# Patient Record
Sex: MALE | Race: OTHER | Employment: FULL TIME | URBAN - METROPOLITAN AREA
[De-identification: names, ages, dates, MRNs, and addresses within clinical notes are randomized per-mention and may not be internally consistent; named-entity substitution may affect disease eponyms.]

---

## 2018-07-22 ENCOUNTER — HOSPITAL ENCOUNTER (EMERGENCY)
Age: 38
Discharge: HOME | End: 2018-07-22
Attending: EMERGENCY MEDICINE
Payer: SELF-PAY

## 2018-07-22 ENCOUNTER — APPOINTMENT (OUTPATIENT)
Dept: CT IMAGING | Age: 38
End: 2018-07-22
Attending: EMERGENCY MEDICINE
Payer: SELF-PAY

## 2018-07-22 VITALS
SYSTOLIC BLOOD PRESSURE: 121 MMHG | RESPIRATION RATE: 16 BRPM | WEIGHT: 140 LBS | BODY MASS INDEX: 23.9 KG/M2 | HEIGHT: 64 IN | DIASTOLIC BLOOD PRESSURE: 81 MMHG | HEART RATE: 68 BPM | TEMPERATURE: 98.4 F | OXYGEN SATURATION: 100 %

## 2018-07-22 DIAGNOSIS — R51.9 NONINTRACTABLE HEADACHE, UNSPECIFIED CHRONICITY PATTERN, UNSPECIFIED HEADACHE TYPE: Primary | ICD-10-CM

## 2018-07-22 PROCEDURE — 99282 EMERGENCY DEPT VISIT SF MDM: CPT

## 2018-07-22 PROCEDURE — 70450 CT HEAD/BRAIN W/O DYE: CPT

## 2018-07-22 RX ORDER — OXYMETAZOLINE HCL 0.05 %
2 SPRAY, NON-AEROSOL (ML) NASAL 2 TIMES DAILY
Qty: 15 ML | Refills: 0 | Status: SHIPPED | OUTPATIENT
Start: 2018-07-22 | End: 2018-07-25

## 2018-07-22 RX ORDER — IBUPROFEN 600 MG/1
600 TABLET ORAL
Qty: 20 TAB | Refills: 0 | Status: SHIPPED | OUTPATIENT
Start: 2018-07-22

## 2018-07-22 NOTE — ED TRIAGE NOTES
Triage note: Pt arrives with c/o frontal headache x 1 week. Pt states pain is constant pressure above and behind eyes. Denies vision changes, N/V. Taking medication with no relief. Pt reports ringing in his ears.

## 2018-07-22 NOTE — DISCHARGE INSTRUCTIONS
Dolor de sai: Instrucciones de cuidado - [ Headache: Care Instructions ]  Instrucciones de cuidado    Los tigist de sai tienen muchas causas posibles. La mayoría de los tigist de sai no son señal de un problema más verena y mejoran por sí solos. El tratamiento en el hogar podría ayudarlo a sentirse mejor con Rojean Levels. El médico lo coles revisado minuciosamente, rogelio puede desarrollar problemas más tarde. Si nota algún problema o síntomas, busque tratamiento médico inmediatamente. La atención de seguimiento es mata parte clave de ly tratamiento y seguridad. Asegúrese de hacer y acudir a todas las citas, y llame a ly médico si está teniendo problemas. También es mata buena idea saber los resultados de fatmata exámenes y mantener mata lista de los medicamentos que reddy. ¿Cómo puede cuidarse en el hogar? · No conduzca si ha tomado analgésicos (medicamentos para el dolor) recetados. · Descanse en un cuarto tranquilo y oscuro hasta que desaparezca el dolor de [place]. Cierre los ojos y trate de relajarse o dormirse. No garret la televisión ni curtis. · Colóquese un paño frío y húmedo o [facility] compresa fría sobre la beto adolorida de 10 a 21 minutos cada vez. Póngase un paño bhat entre la compresa fría y la piel. · Utilice mata toalla húmeda tibia o mata almohadilla térmica ajustada a [place] temperatura para relajar los músculos tensos del lizbet y los hombros.  · Pídale a alguien que le mary masajes suaves en el lizbet y los hombros.  · [employer] los analgésicos exactamente wojciech le fueron indicados. ¨ Si el médico le recetó un analgésico, tómelo según las indicaciones. ¨ Si no está tomando un analgésico recetado, pregúntele a ly médico si puede david suzanne de [employer]. · Tenga cuidado de no david analgésicos con mayor frecuencia que la permitida en las indicaciones porque los tigist de sia podrían empeorar o aparecer con mayor frecuencia mata vez que el medicamento pierda ly [place].   · Preste atención a los nuevos síntomas que aparecen con el dolor de [place], [place], debilidad o entumecimiento, cambios en la visión o confusión. Podrían ser señales de un problema más grave. Para prevenir los tigist de sai  · [employer] un diario de fatmata tigist de sai para que pueda averiguar qué los desencadena. Evitar los desencadenantes podría ayudar a prevenir los tigist de [place]. Anote cuándo empieza cada dolor de [place], cuánto dura y cómo es el dolor (palpitante, esequiel, punzante o sordo). Anote cualquier otro síntoma que haya tenido con el dolor de [place], [place] náuseas, destellos de no o MARCELO, o sensibilidad a la no brillante o a los ruidos mark. Anote si el dolor de sai ocurrió cerca de ly menstruación. Enumere todos los factores que pudieran kimberly desencadenado el dolor de [place], wojciech ciertos alimentos (chocolate, queso, vino) u olores, humo, luces brillantes, estrés o falta de sueño. · Encuentre maneras saludables de [facility]. Los tigist de [place] son más comunes drew o tab después de un momento estresante. Tómese un tiempo para relajarse antes y después de hacer algo que le haya causado un dolor de sai en el pasado. · Trate de mantener fatmata músculos relajados mediante mata buena postura. Revise si tiene [employer] de la [place], la kenyetta, el lizbet y los hombros y trate de relajarlos. Cuando se siente en un escritorio, cambie de posición con frecuencia y estírese por 27 segundos cada hora. · Dang suficiente ejercicio y duerma bastante. · Coma en forma regular y ekaterina. Largos períodos sin comer pueden provocar un dolor de sai. · Regálese un masaje. Algunas personas encuentran que los masajes hechos con regularidad son Jose Edelson para aliviar la tensión. · Limite la cafeína. No wanda demasiado café, té ni sodas. Yury no deje de consumir cafeína de repente, porque eso también puede provocarle tigist de [place].   · Reduzca la tensión en los ojos a causa de la computadora parpadeando con frecuencia y apartando la mirada de la pantalla a menudo. Asegúrese de tener lentes adecuados y de que ly monitor esté colocado de manera correcta, wojciech a un brazo de distancia. · Busque ayuda si tiene depresión o ansiedad. Manuela tigist de Kayden Kells podrían relacionarse con estas afecciones. El tratamiento puede prevenir los tigist de Kayden Kells y ayudar con los síntomas de ansiedad o depresión. ¿Cuándo debe pedir ayuda? Llame al 911 en cualquier momento que piense que puede necesitar atención de emergencia. Por ejemplo, llame si:    · Tiene señales de un ataque cerebral. Estas pueden incluir:  ¨ Parálisis, entumecimiento o debilidad repentinos en la kenyetta, el brazo o la pierna, sobre todo si ocurre en un solo lado del cuerpo. ¨ Cambios repentinos en la visión. ¨ Dificultades repentinas para hablar. ¨ Confusión repentina o dificultad para comprender frases sencillas. ¨ Problemas repentinos para caminar o mantener el equilibrio. ¨ Dolor de Kayden Kells intenso y repentino, distinto de los tigist de sai anteriores.    Llame a ly médico ahora mismo o busque atención médica inmediata si:    · Tiene un dolor de [place] o peor.     · Ly dolor de sai empeora mucho. ¿Dónde puede encontrar más información en inglés? Suki Herring a [email or web address].info/. Escriba X655 en la búsqueda para aprender más acerca de \"Dolor de sai: Instrucciones de cuidado - [ Headache: Care Instructions ]. \"  Revisado: 9 octubre, 2017  Versión del contenido: 11.7  © 7039-3873 [employer]. Las instrucciones de cuidado fueron adaptadas bajo licencia por [employer] (which disclaims liability or warranty for this information). Si usted tiene [place] afección médica o sobre estas instrucciones, siempre pregunte a ly profesional de edvin. [employer], disco volante niega toda garantía o responsabilidad por ly uso de esta información.

## 2018-07-22 NOTE — ED PROVIDER NOTES
HPI Comments: 45-year-old  male presents to the emergency department with headache. He reports frontal headache for about a week. The headache is nonradiating. He has had sinus issues in the past and sinus infections. Pt does have some congestion and mild sore throat. No neck pain or neck stiffness. No fevers. No vomiting or diarrhea. He also reports some intermittent ringing in his ears. Patient was seen here twice 2 years ago for headaches. He does have intermittent headaches. No medications were tried prior to arrival. He does report some mild phonophobia and photophobia. Patient denies weakness or numbness in arms or legs. No difficulty with speech or vision. Patient denies chest pain or shortness of breath. He denies tobacco or alcohol use. The history is provided by the patient. History reviewed. No pertinent past medical history. History reviewed. No pertinent surgical history. No family history on file. Social History     Social History    Marital status:      Spouse name: N/A    Number of children: N/A    Years of education: N/A     Occupational History    Not on file. Social History Main Topics    Smoking status: Never Smoker    Smokeless tobacco: Not on file    Alcohol use No    Drug use: Not on file    Sexual activity: Not on file     Other Topics Concern    Not on file     Social History Narrative         ALLERGIES: Review of patient's allergies indicates no known allergies. Review of Systems   Constitutional: Negative for activity change and fever. HENT: Negative for congestion. Eyes: Negative for pain. Respiratory: Negative for cough and shortness of breath. Cardiovascular: Negative for chest pain and leg swelling. Gastrointestinal: Negative for abdominal pain. Endocrine: Negative for polyuria. Genitourinary: Negative for flank pain and hematuria. Musculoskeletal: Negative for gait problem, neck pain and neck stiffness.    Skin: Negative for color change. Allergic/Immunologic: Negative for immunocompromised state. Neurological: Positive for headaches. Negative for speech difficulty and weakness. Hematological: Does not bruise/bleed easily. Psychiatric/Behavioral: Negative for confusion. All other systems reviewed and are negative. Vitals:    07/22/18 1309   BP: 137/85   Pulse: 77   Resp: 16   Temp: 97.5 °F (36.4 °C)   SpO2: 100%   Weight: 63.5 kg (140 lb)   Height: 5' 4\" (1.626 m)            Physical Exam   Constitutional: He is oriented to person, place, and time. He appears well-developed and well-nourished. No distress. HENT:   Head: Normocephalic and atraumatic. Right Ear: External ear normal.   Left Ear: External ear normal.   Nose: Nose normal.   Mouth/Throat: No oropharyngeal exudate. Oropharyx is slightly red but no exudate. No max of frontal sinus tenderness or swelling   Eyes: EOM are normal. Pupils are equal, round, and reactive to light. Neck: Normal range of motion. Neck supple. No JVD present. No tracheal deviation present. Cardiovascular: Normal rate, regular rhythm and normal heart sounds. Exam reveals no gallop and no friction rub. No murmur heard. Pulmonary/Chest: Effort normal and breath sounds normal. No stridor. No respiratory distress. He has no wheezes. He has no rales. Abdominal: Soft. Bowel sounds are normal. He exhibits no distension. There is no tenderness. There is no rebound and no guarding. Musculoskeletal: Normal range of motion. He exhibits no edema or tenderness. Neurological: He is alert and oriented to person, place, and time. He has normal reflexes. No cranial nerve deficit. Coordination normal.   Cranial nerves 2-12 are intact. Strength 5/5 and normal in biceps, triceps and hand  bilaterally. Strength 5/5 in plantar and dorsi flexion of feet bilaterally. Normal sensation in arms and legs bilaterally to light touch. Normal finger to nose bilaterally.   Pt ambulated to CT scanner w/o difficulty   Skin: Skin is warm and dry. No rash noted. He is not diaphoretic. No erythema. Psychiatric: He has a normal mood and affect. His behavior is normal. Judgment and thought content normal.   Nursing note and vitals reviewed. MDM  Number of Diagnoses or Management Options  Diagnosis management comments: Patient overall looks well and nontoxic. He was able to Leyda Pal to the bathroom and his CT scan without much difficulty. We'll check a CT of the head. If the CT is negative, will treat with anti-inflammatories and headache medications and give PCP for followup. Patient agrees with this plan. Amount and/or Complexity of Data Reviewed  Tests in the radiology section of CPT®: ordered and reviewed  Decide to obtain previous medical records or to obtain history from someone other than the patient: yes  Review and summarize past medical records: yes  Independent visualization of images, tracings, or specimens: yes          ED Course       Procedures    CT head- No acute process    Home with Ramon Hannon    PCP for f/u    Good return precautions given to patient. Close follow up with PCP recommended. Patient and/or family voices understanding of this plan. Discharge instructions were explained by me and all concerns were addressed.

## 2018-07-22 NOTE — ED NOTES
1:09 PM  I have evaluated the patient as the Provider in Triage. I have reviewed His vital signs and the triage nurse assessment. I have talked with the patient and any available family and advised that I am the provider in triage and have ordered the appropriate study to initiate their work up based on the clinical presentation during my assessment. I have advised that the patient will be accommodated in the Main ED as soon as possible. I have also requested to contact the triage nurse or myself immediately if the patient experiences any changes in their condition during this brief waiting period. Carole Jackson PA-C    Headache x 1 week constant. No vomiting. Fever or chills. No neck pain. Nontoxic appearing.